# Patient Record
Sex: FEMALE | Race: WHITE
[De-identification: names, ages, dates, MRNs, and addresses within clinical notes are randomized per-mention and may not be internally consistent; named-entity substitution may affect disease eponyms.]

---

## 2019-06-24 ENCOUNTER — HOSPITAL ENCOUNTER (OUTPATIENT)
Dept: HOSPITAL 6 - LAB | Age: 64
End: 2019-06-24
Attending: INTERNAL MEDICINE
Payer: SELF-PAY

## 2019-06-24 DIAGNOSIS — D75.1: Primary | ICD-10-CM

## 2019-06-24 LAB
ERYTHROCYTE [DISTWIDTH] IN BLOOD BY AUTOMATED COUNT: 16.1 % (ref 11.5–14.5)
HCT VFR BLD AUTO: 51.9 % (ref 37–47)
HGB BLD-MCNC: 17.3 G/DL (ref 12.5–16)
LYMPHOCYTES NFR BLD MANUAL: 13 % (ref 20–51)
MCH RBC QN AUTO: 28 PG (ref 27–31)
MCHC RBC AUTO-ENTMCNC: 33 G/DL (ref 33–37)
MCV RBC AUTO: 83 FL (ref 78–100)
MONOCYTES NFR BLD: 5 % (ref 3–10)
NEUTS SEG NFR BLD MANUAL: 80 % (ref 42–75)
PLATELET # BLD AUTO: 1203 K/MM3 (ref 130–400)
PMV BLD AUTO: 10.8 FL (ref 7.4–10.4)
RBC # BLD AUTO: 6.25 M/MM3 (ref 4.1–5.3)
WBC # BLD AUTO: 15.2 K/MM3 (ref 4.8–10.8)

## 2019-07-08 ENCOUNTER — HOSPITAL ENCOUNTER (OUTPATIENT)
Dept: HOSPITAL 6 - LAB | Age: 64
End: 2019-07-08
Attending: INTERNAL MEDICINE
Payer: SELF-PAY

## 2019-07-08 DIAGNOSIS — D75.1: Primary | ICD-10-CM

## 2019-07-08 LAB
BASOPHILS # BLD: 0.1 10*3/UL (ref 0.02–0.1)
EOSINOPHIL # BLD: 0.2 10*3/UL (ref 0.04–0.4)
EOSINOPHIL NFR BLD: 1.9 % (ref 1–5)
ERYTHROCYTE [DISTWIDTH] IN BLOOD BY AUTOMATED COUNT: 18 % (ref 11.5–14.5)
HCT VFR BLD AUTO: 45.5 % (ref 37–47)
HGB BLD-MCNC: 15 G/DL (ref 12.5–16)
LYMPHOCYTES # BLD: 1.8 10*3/UL (ref 1.5–4)
MCH RBC QN AUTO: 29 PG (ref 27–31)
MCHC RBC AUTO-ENTMCNC: 33 G/DL (ref 33–37)
MCV RBC AUTO: 87 FL (ref 78–100)
MONOCYTES # BLD: 0.6 10*3/UL (ref 0.2–0.8)
NEUTROPHILS # BLD: 9.6 10*3/UL (ref 1.4–6.5)
PLATELET # BLD AUTO: 1042 K/MM3 (ref 130–400)
PMV BLD AUTO: 10.2 FL (ref 7.4–10.4)
RBC # BLD AUTO: 5.25 M/MM3 (ref 4.1–5.3)
WBC # BLD AUTO: 12.4 K/MM3 (ref 4.8–10.8)

## 2019-07-22 ENCOUNTER — HOSPITAL ENCOUNTER (OUTPATIENT)
Dept: HOSPITAL 6 - LAB | Age: 64
End: 2019-07-22
Attending: INTERNAL MEDICINE
Payer: SELF-PAY

## 2019-07-22 DIAGNOSIS — D75.9: Primary | ICD-10-CM

## 2019-07-22 LAB
BASOPHILS # BLD: 0.1 10*3/UL (ref 0.02–0.1)
EOSINOPHIL # BLD: 0.3 10*3/UL (ref 0.04–0.4)
EOSINOPHIL NFR BLD: 2.3 % (ref 1–5)
ERYTHROCYTE [DISTWIDTH] IN BLOOD BY AUTOMATED COUNT: 19.1 % (ref 11.5–14.5)
HCT VFR BLD AUTO: 43.7 % (ref 37–47)
HGB BLD-MCNC: 14.5 G/DL (ref 12.5–16)
LYMPHOCYTES # BLD: 1.9 10*3/UL (ref 1.5–4)
MCH RBC QN AUTO: 30 PG (ref 27–31)
MCHC RBC AUTO-ENTMCNC: 33 G/DL (ref 33–37)
MCV RBC AUTO: 90 FL (ref 78–100)
MONOCYTES # BLD: 0.7 10*3/UL (ref 0.2–0.8)
NEUTROPHILS # BLD: 7.9 10*3/UL (ref 1.4–6.5)
PLATELET # BLD AUTO: 862 K/MM3 (ref 130–400)
PMV BLD AUTO: 10.4 FL (ref 7.4–10.4)
RBC # BLD AUTO: 4.84 M/MM3 (ref 4.1–5.3)
WBC # BLD AUTO: 10.8 K/MM3 (ref 4.8–10.8)

## 2019-08-26 ENCOUNTER — HOSPITAL ENCOUNTER (OUTPATIENT)
Dept: HOSPITAL 6 - LAB | Age: 64
End: 2019-08-26
Attending: INTERNAL MEDICINE
Payer: SELF-PAY

## 2019-08-26 DIAGNOSIS — D75.1: Primary | ICD-10-CM

## 2019-08-26 LAB
BASOPHILS # BLD: 0 10*3/UL (ref 0.02–0.1)
EOSINOPHIL # BLD: 0.1 10*3/UL (ref 0.04–0.4)
EOSINOPHIL NFR BLD: 2.1 % (ref 1–5)
ERYTHROCYTE [DISTWIDTH] IN BLOOD BY AUTOMATED COUNT: 17.4 % (ref 11.5–14.5)
HCT VFR BLD AUTO: 41.6 % (ref 37–47)
HGB BLD-MCNC: 13.7 G/DL (ref 12.5–16)
LYMPHOCYTES # BLD: 1 10*3/UL (ref 1.5–4)
MCH RBC QN AUTO: 32 PG (ref 27–31)
MCHC RBC AUTO-ENTMCNC: 33 G/DL (ref 33–37)
MCV RBC AUTO: 96 FL (ref 78–100)
MONOCYTES # BLD: 0.3 10*3/UL (ref 0.2–0.8)
NEUTROPHILS # BLD: 3.9 10*3/UL (ref 1.4–6.5)
PLATELET # BLD AUTO: 262 K/MM3 (ref 130–400)
PMV BLD AUTO: 10.1 FL (ref 7.4–10.4)
RBC # BLD AUTO: 4.33 M/MM3 (ref 4.1–5.3)
WBC # BLD AUTO: 5.3 K/MM3 (ref 4.8–10.8)

## 2019-10-07 ENCOUNTER — HOSPITAL ENCOUNTER (OUTPATIENT)
Dept: HOSPITAL 6 - LAB | Age: 64
End: 2019-10-07
Attending: INTERNAL MEDICINE
Payer: SELF-PAY

## 2019-10-07 DIAGNOSIS — D75.1: Primary | ICD-10-CM

## 2019-10-07 LAB
BASOPHILS # BLD: 0 10*3/UL (ref 0.02–0.1)
EOSINOPHIL # BLD: 0.1 10*3/UL (ref 0.04–0.4)
EOSINOPHIL NFR BLD: 1.3 % (ref 1–5)
ERYTHROCYTE [DISTWIDTH] IN BLOOD BY AUTOMATED COUNT: 17.4 % (ref 11.5–14.5)
HCT VFR BLD AUTO: 37.4 % (ref 37–47)
HGB BLD-MCNC: 12.8 G/DL (ref 12.5–16)
LYMPHOCYTES # BLD: 0.9 10*3/UL (ref 1.5–4)
MCH RBC QN AUTO: 35 PG (ref 27–31)
MCHC RBC AUTO-ENTMCNC: 34 G/DL (ref 33–37)
MCV RBC AUTO: 101 FL (ref 78–100)
MONOCYTES # BLD: 0.3 10*3/UL (ref 0.2–0.8)
NEUTROPHILS # BLD: 3.9 10*3/UL (ref 1.4–6.5)
PLATELET # BLD AUTO: 141 K/MM3 (ref 130–400)
PMV BLD AUTO: 10.5 FL (ref 7.4–10.4)
RBC # BLD AUTO: 3.71 M/MM3 (ref 4.1–5.3)
WBC # BLD AUTO: 5.2 K/MM3 (ref 4.8–10.8)

## 2019-10-28 ENCOUNTER — HOSPITAL ENCOUNTER (OUTPATIENT)
Dept: HOSPITAL 6 - LAB | Age: 64
End: 2019-10-28
Attending: INTERNAL MEDICINE
Payer: SELF-PAY

## 2019-10-28 DIAGNOSIS — D75.1: Primary | ICD-10-CM

## 2019-10-28 LAB
BASOPHILS # BLD: 0 10*3/UL (ref 0.02–0.1)
EOSINOPHIL # BLD: 0 10*3/UL (ref 0.04–0.4)
EOSINOPHIL NFR BLD: 0.6 % (ref 1–5)
ERYTHROCYTE [DISTWIDTH] IN BLOOD BY AUTOMATED COUNT: 18.3 % (ref 11.5–14.5)
HCT VFR BLD AUTO: 37 % (ref 37–47)
HGB BLD-MCNC: 12.8 G/DL (ref 12.5–16)
LYMPHOCYTES # BLD: 1 10*3/UL (ref 1.5–4)
MCH RBC QN AUTO: 36 PG (ref 27–31)
MCHC RBC AUTO-ENTMCNC: 35 G/DL (ref 33–37)
MCV RBC AUTO: 104 FL (ref 78–100)
MONOCYTES # BLD: 0.3 10*3/UL (ref 0.2–0.8)
NEUTROPHILS # BLD: 2.1 10*3/UL (ref 1.4–6.5)
PLATELET # BLD AUTO: 299 K/MM3 (ref 130–400)
PMV BLD AUTO: 9.8 FL (ref 7.4–10.4)
RBC # BLD AUTO: 3.55 M/MM3 (ref 4.1–5.3)
WBC # BLD AUTO: 3.4 K/MM3 (ref 4.8–10.8)

## 2019-11-18 ENCOUNTER — HOSPITAL ENCOUNTER (OUTPATIENT)
Dept: HOSPITAL 6 - LAB | Age: 64
End: 2019-11-18
Attending: INTERNAL MEDICINE
Payer: SELF-PAY

## 2019-11-18 DIAGNOSIS — D75.1: Primary | ICD-10-CM

## 2019-11-18 LAB
BASOPHILS # BLD: 0 10*3/UL (ref 0.02–0.1)
EOSINOPHIL # BLD: 0.1 10*3/UL (ref 0.04–0.4)
EOSINOPHIL NFR BLD: 1.2 % (ref 1–5)
ERYTHROCYTE [DISTWIDTH] IN BLOOD BY AUTOMATED COUNT: 16.6 % (ref 11.5–14.5)
HCT VFR BLD AUTO: 36.9 % (ref 37–47)
HGB BLD-MCNC: 12.7 G/DL (ref 12.5–16)
LYMPHOCYTES # BLD: 1 10*3/UL (ref 1.5–4)
MCH RBC QN AUTO: 38 PG (ref 27–31)
MCHC RBC AUTO-ENTMCNC: 34 G/DL (ref 33–37)
MCV RBC AUTO: 110 FL (ref 78–100)
MONOCYTES # BLD: 0.3 10*3/UL (ref 0.2–0.8)
NEUTROPHILS # BLD: 3.8 10*3/UL (ref 1.4–6.5)
PLATELET # BLD AUTO: 103 K/MM3 (ref 130–400)
PMV BLD AUTO: 10.5 FL (ref 7.4–10.4)
RBC # BLD AUTO: 3.36 M/MM3 (ref 4.1–5.3)
WBC # BLD AUTO: 5.2 K/MM3 (ref 4.8–10.8)

## 2019-12-16 ENCOUNTER — HOSPITAL ENCOUNTER (OUTPATIENT)
Dept: HOSPITAL 6 - LAB | Age: 64
End: 2019-12-16
Attending: INTERNAL MEDICINE
Payer: SELF-PAY

## 2019-12-16 DIAGNOSIS — D75.1: Primary | ICD-10-CM

## 2019-12-16 LAB
BASOPHILS # BLD: 0 10*3/UL (ref 0.02–0.1)
EOSINOPHIL # BLD: 0 10*3/UL (ref 0.04–0.4)
EOSINOPHIL NFR BLD: 0.3 % (ref 1–5)
ERYTHROCYTE [DISTWIDTH] IN BLOOD BY AUTOMATED COUNT: 16.2 % (ref 11.5–14.5)
HCT VFR BLD AUTO: 24.5 % (ref 37–47)
HGB BLD-MCNC: 7.9 G/DL (ref 12.5–16)
LYMPHOCYTES # BLD: 1 10*3/UL (ref 1.5–4)
MCH RBC QN AUTO: 40 PG (ref 27–31)
MCHC RBC AUTO-ENTMCNC: 32 G/DL (ref 33–37)
MCV RBC AUTO: 123 FL (ref 78–100)
MONOCYTES # BLD: 0.6 10*3/UL (ref 0.2–0.8)
NEUTROPHILS # BLD: 4.9 10*3/UL (ref 1.4–6.5)
PLATELET # BLD AUTO: 386 K/MM3 (ref 130–400)
PMV BLD AUTO: 10.2 FL (ref 7.4–10.4)
RBC # BLD AUTO: 2 M/MM3 (ref 4.1–5.3)
WBC # BLD AUTO: 6.6 K/MM3 (ref 4.8–10.8)

## 2019-12-17 ENCOUNTER — HOSPITAL ENCOUNTER (OUTPATIENT)
Dept: HOSPITAL 6 - LAB | Age: 64
End: 2019-12-17
Attending: INTERNAL MEDICINE
Payer: SELF-PAY

## 2019-12-17 VITALS — SYSTOLIC BLOOD PRESSURE: 119 MMHG | DIASTOLIC BLOOD PRESSURE: 54 MMHG

## 2019-12-17 VITALS — SYSTOLIC BLOOD PRESSURE: 118 MMHG | DIASTOLIC BLOOD PRESSURE: 52 MMHG

## 2019-12-17 VITALS — DIASTOLIC BLOOD PRESSURE: 56 MMHG | SYSTOLIC BLOOD PRESSURE: 115 MMHG

## 2019-12-17 VITALS — DIASTOLIC BLOOD PRESSURE: 54 MMHG | SYSTOLIC BLOOD PRESSURE: 110 MMHG

## 2019-12-17 VITALS
SYSTOLIC BLOOD PRESSURE: 140 MMHG | DIASTOLIC BLOOD PRESSURE: 55 MMHG | SYSTOLIC BLOOD PRESSURE: 140 MMHG | DIASTOLIC BLOOD PRESSURE: 55 MMHG | DIASTOLIC BLOOD PRESSURE: 55 MMHG | DIASTOLIC BLOOD PRESSURE: 55 MMHG | SYSTOLIC BLOOD PRESSURE: 140 MMHG | DIASTOLIC BLOOD PRESSURE: 55 MMHG | SYSTOLIC BLOOD PRESSURE: 140 MMHG | SYSTOLIC BLOOD PRESSURE: 140 MMHG | DIASTOLIC BLOOD PRESSURE: 55 MMHG | SYSTOLIC BLOOD PRESSURE: 140 MMHG

## 2019-12-17 VITALS — SYSTOLIC BLOOD PRESSURE: 116 MMHG | DIASTOLIC BLOOD PRESSURE: 55 MMHG

## 2019-12-17 VITALS — HEIGHT: 64.02 IN | WEIGHT: 130.29 LBS | BODY MASS INDEX: 22.24 KG/M2

## 2019-12-17 VITALS — SYSTOLIC BLOOD PRESSURE: 141 MMHG | DIASTOLIC BLOOD PRESSURE: 52 MMHG

## 2019-12-17 VITALS — SYSTOLIC BLOOD PRESSURE: 126 MMHG | DIASTOLIC BLOOD PRESSURE: 51 MMHG

## 2019-12-17 VITALS — SYSTOLIC BLOOD PRESSURE: 109 MMHG | DIASTOLIC BLOOD PRESSURE: 52 MMHG

## 2019-12-17 VITALS — DIASTOLIC BLOOD PRESSURE: 51 MMHG | SYSTOLIC BLOOD PRESSURE: 126 MMHG

## 2019-12-17 DIAGNOSIS — D75.1: ICD-10-CM

## 2019-12-17 DIAGNOSIS — D47.1: Primary | ICD-10-CM

## 2019-12-17 PROCEDURE — P9016 RBC LEUKOCYTES REDUCED: HCPCS

## 2019-12-23 ENCOUNTER — HOSPITAL ENCOUNTER (OUTPATIENT)
Dept: HOSPITAL 6 - LAB | Age: 64
End: 2019-12-23
Attending: INTERNAL MEDICINE
Payer: SELF-PAY

## 2019-12-23 DIAGNOSIS — D75.9: Primary | ICD-10-CM

## 2019-12-23 LAB
ERYTHROCYTE [DISTWIDTH] IN BLOOD BY AUTOMATED COUNT: 20 % (ref 11.5–14.5)
HCT VFR BLD AUTO: 36.8 % (ref 37–47)
HGB BLD-MCNC: 11.9 G/DL (ref 12.5–16)
LYMPHOCYTES NFR BLD MANUAL: 8 % (ref 20–51)
MACROCYTES BLD QL SMEAR: (no result)
MCH RBC QN AUTO: 36 PG (ref 27–31)
MCHC RBC AUTO-ENTMCNC: 32 G/DL (ref 33–37)
MCV RBC AUTO: 113 FL (ref 78–100)
MONOCYTES NFR BLD: 5 % (ref 3–10)
NEUTS SEG NFR BLD MANUAL: 86 % (ref 42–75)
OVALOCYTES BLD QL SMEAR: (no result)
PLATELET # BLD AUTO: 310 K/MM3 (ref 130–400)
PMV BLD AUTO: 10.6 FL (ref 7.4–10.4)
RBC # BLD AUTO: 3.27 M/MM3 (ref 4.1–5.3)
WBC # BLD AUTO: 6.5 K/MM3 (ref 4.8–10.8)

## 2020-01-13 ENCOUNTER — HOSPITAL ENCOUNTER (OUTPATIENT)
Dept: HOSPITAL 6 - LAB | Age: 65
End: 2020-01-13
Attending: INTERNAL MEDICINE
Payer: SELF-PAY

## 2020-01-13 DIAGNOSIS — D47.1: Primary | ICD-10-CM

## 2020-01-13 LAB
BASOPHILS # BLD: 0 10*3/UL (ref 0.02–0.1)
EOSINOPHIL # BLD: 0.1 10*3/UL (ref 0.04–0.4)
EOSINOPHIL NFR BLD: 1.3 % (ref 1–5)
ERYTHROCYTE [DISTWIDTH] IN BLOOD BY AUTOMATED COUNT: 16.2 % (ref 11.5–14.5)
HCT VFR BLD AUTO: 40.3 % (ref 37–47)
HGB BLD-MCNC: 13.4 G/DL (ref 12.5–16)
LYMPHOCYTES # BLD: 1.4 10*3/UL (ref 1.5–4)
MCH RBC QN AUTO: 35 PG (ref 27–31)
MCHC RBC AUTO-ENTMCNC: 33 G/DL (ref 33–37)
MCV RBC AUTO: 106 FL (ref 78–100)
MONOCYTES # BLD: 0.4 10*3/UL (ref 0.2–0.8)
NEUTROPHILS # BLD: 5.8 10*3/UL (ref 1.4–6.5)
PLATELET # BLD AUTO: 280 K/MM3 (ref 130–400)
PMV BLD AUTO: 10.8 FL (ref 7.4–10.4)
RBC # BLD AUTO: 3.79 M/MM3 (ref 4.1–5.3)
WBC # BLD AUTO: 7.8 K/MM3 (ref 4.8–10.8)

## 2020-02-10 ENCOUNTER — HOSPITAL ENCOUNTER (OUTPATIENT)
Dept: HOSPITAL 6 - LAB | Age: 65
End: 2020-02-10
Attending: INTERNAL MEDICINE
Payer: SELF-PAY

## 2020-02-10 DIAGNOSIS — D47.1: Primary | ICD-10-CM

## 2020-02-10 LAB
ALBUMIN SERPL-MCNC: 4.2 G/DL (ref 3.4–4.8)
ALT SERPL-CCNC: 19 U/L (ref 0–55)
AST SERPL-CCNC: 22 U/L (ref 5–34)
BASOPHILS # BLD: 0 10*3/UL (ref 0.02–0.1)
BILIRUB SERPL-MCNC: 0.6 MG/DL (ref 0.2–1.2)
CALCIUM SERPL-MCNC: 9.4 MG/DL (ref 8.3–10.5)
CO2 SERPL-SCNC: 24 MMOL/L (ref 23–31)
EOSINOPHIL # BLD: 0.1 10*3/UL (ref 0.04–0.4)
EOSINOPHIL NFR BLD: 1 % (ref 1–5)
ERYTHROCYTE [DISTWIDTH] IN BLOOD BY AUTOMATED COUNT: 15.8 % (ref 11.5–14.5)
GLUCOSE SERPL-MCNC: 110 MG/DL (ref 65–105)
HCT VFR BLD AUTO: 39.3 % (ref 37–47)
HGB BLD-MCNC: 13.2 G/DL (ref 12.5–16)
LYMPHOCYTES # BLD: 1.2 10*3/UL (ref 1.5–4)
MCH RBC QN AUTO: 37 PG (ref 27–31)
MCHC RBC AUTO-ENTMCNC: 34 G/DL (ref 33–37)
MCV RBC AUTO: 109 FL (ref 78–100)
MONOCYTES # BLD: 0.3 10*3/UL (ref 0.2–0.8)
NEUTROPHILS # BLD: 3.5 10*3/UL (ref 1.4–6.5)
PLATELET # BLD AUTO: 296 K/MM3 (ref 130–400)
PMV BLD AUTO: 9.6 FL (ref 7.4–10.4)
POTASSIUM SERPL-SCNC: 4.8 MMOL/L (ref 3.5–5.1)
PROT SERPL-MCNC: 7.2 G/DL (ref 6.2–8.1)
RBC # BLD AUTO: 3.62 M/MM3 (ref 4.1–5.3)
SODIUM SERPL-SCNC: 138 MMOL/L (ref 136–145)
WBC # BLD AUTO: 5 K/MM3 (ref 4.8–10.8)

## 2020-03-16 ENCOUNTER — HOSPITAL ENCOUNTER (OUTPATIENT)
Dept: HOSPITAL 6 - LAB | Age: 65
End: 2020-03-16
Attending: INTERNAL MEDICINE
Payer: SELF-PAY

## 2020-03-16 DIAGNOSIS — D47.1: Primary | ICD-10-CM

## 2020-03-16 LAB
BASOPHILS # BLD: 0 10*3/UL (ref 0.02–0.1)
EOSINOPHIL # BLD: 0.1 10*3/UL (ref 0.04–0.4)
EOSINOPHIL NFR BLD: 1.1 % (ref 1–5)
ERYTHROCYTE [DISTWIDTH] IN BLOOD BY AUTOMATED COUNT: 14.3 % (ref 11.5–14.5)
HCT VFR BLD AUTO: 38.8 % (ref 37–47)
HGB BLD-MCNC: 13.1 G/DL (ref 12.5–16)
LYMPHOCYTES # BLD: 1.5 10*3/UL (ref 1.5–4)
MCH RBC QN AUTO: 37 PG (ref 27–31)
MCHC RBC AUTO-ENTMCNC: 34 G/DL (ref 33–37)
MCV RBC AUTO: 109 FL (ref 78–100)
MONOCYTES # BLD: 0.4 10*3/UL (ref 0.2–0.8)
NEUTROPHILS # BLD: 3.6 10*3/UL (ref 1.4–6.5)
PLATELET # BLD AUTO: 347 K/MM3 (ref 130–400)
PMV BLD AUTO: 9.5 FL (ref 7.4–10.4)
RBC # BLD AUTO: 3.56 M/MM3 (ref 4.1–5.3)
WBC # BLD AUTO: 5.6 K/MM3 (ref 4.8–10.8)

## 2020-05-06 ENCOUNTER — HOSPITAL ENCOUNTER (OUTPATIENT)
Dept: HOSPITAL 6 - LAB | Age: 65
End: 2020-05-06
Attending: INTERNAL MEDICINE
Payer: SELF-PAY

## 2020-05-06 DIAGNOSIS — D47.1: Primary | ICD-10-CM

## 2020-05-06 LAB
BASOPHILS # BLD: 0 10*3/UL (ref 0.02–0.1)
EOSINOPHIL # BLD: 0.1 10*3/UL (ref 0.04–0.4)
EOSINOPHIL NFR BLD: 1.8 % (ref 1–5)
ERYTHROCYTE [DISTWIDTH] IN BLOOD BY AUTOMATED COUNT: 11.6 % (ref 11.5–14.5)
HCT VFR BLD AUTO: 38.7 % (ref 37–47)
HGB BLD-MCNC: 12.8 G/DL (ref 12.5–16)
LYMPHOCYTES # BLD: 1.3 10*3/UL (ref 1.5–4)
MCH RBC QN AUTO: 37 PG (ref 27–31)
MCHC RBC AUTO-ENTMCNC: 33 G/DL (ref 33–37)
MCV RBC AUTO: 111 FL (ref 78–100)
MONOCYTES # BLD: 0.5 10*3/UL (ref 0.2–0.8)
NEUTROPHILS # BLD: 4.2 10*3/UL (ref 1.4–6.5)
PLATELET # BLD AUTO: 344 K/MM3 (ref 130–400)
PMV BLD AUTO: 9.6 FL (ref 7.4–10.4)
RBC # BLD AUTO: 3.5 M/MM3 (ref 4.1–5.3)
WBC # BLD AUTO: 6.2 K/MM3 (ref 4.8–10.8)

## 2022-05-14 ENCOUNTER — HOSPITAL ENCOUNTER (INPATIENT)
Dept: HOSPITAL 19 - COL.ER | Age: 67
LOS: 3 days | Discharge: HOME | DRG: 812 | End: 2022-05-17
Attending: INTERNAL MEDICINE | Admitting: INTERNAL MEDICINE
Payer: MEDICARE

## 2022-05-14 VITALS — HEART RATE: 101 BPM | DIASTOLIC BLOOD PRESSURE: 52 MMHG | SYSTOLIC BLOOD PRESSURE: 102 MMHG | TEMPERATURE: 99.5 F

## 2022-05-14 VITALS — WEIGHT: 145.51 LBS | HEIGHT: 55 IN

## 2022-05-14 VITALS — TEMPERATURE: 99.4 F | HEART RATE: 97 BPM | DIASTOLIC BLOOD PRESSURE: 57 MMHG | SYSTOLIC BLOOD PRESSURE: 119 MMHG

## 2022-05-14 VITALS — DIASTOLIC BLOOD PRESSURE: 50 MMHG | HEART RATE: 91 BPM | SYSTOLIC BLOOD PRESSURE: 115 MMHG | TEMPERATURE: 98.2 F

## 2022-05-14 VITALS — SYSTOLIC BLOOD PRESSURE: 126 MMHG | TEMPERATURE: 98.7 F | DIASTOLIC BLOOD PRESSURE: 47 MMHG | HEART RATE: 91 BPM

## 2022-05-14 VITALS — TEMPERATURE: 99.4 F | HEART RATE: 92 BPM | SYSTOLIC BLOOD PRESSURE: 114 MMHG | DIASTOLIC BLOOD PRESSURE: 62 MMHG

## 2022-05-14 VITALS — SYSTOLIC BLOOD PRESSURE: 111 MMHG | DIASTOLIC BLOOD PRESSURE: 66 MMHG | TEMPERATURE: 99.2 F | HEART RATE: 96 BPM

## 2022-05-14 VITALS — TEMPERATURE: 99.3 F | DIASTOLIC BLOOD PRESSURE: 58 MMHG | SYSTOLIC BLOOD PRESSURE: 114 MMHG | HEART RATE: 94 BPM

## 2022-05-14 DIAGNOSIS — Z87.891: ICD-10-CM

## 2022-05-14 DIAGNOSIS — D64.9: Primary | ICD-10-CM

## 2022-05-14 DIAGNOSIS — J20.9: ICD-10-CM

## 2022-05-14 DIAGNOSIS — J44.1: ICD-10-CM

## 2022-05-14 DIAGNOSIS — J47.9: ICD-10-CM

## 2022-05-14 DIAGNOSIS — D45: ICD-10-CM

## 2022-05-14 DIAGNOSIS — J44.0: ICD-10-CM

## 2022-05-14 DIAGNOSIS — R16.1: ICD-10-CM

## 2022-05-14 DIAGNOSIS — Z79.52: ICD-10-CM

## 2022-05-14 DIAGNOSIS — D47.3: ICD-10-CM

## 2022-05-14 LAB
ALBUMIN SERPL-MCNC: 3.7 GM/DL (ref 3.4–4.8)
ALP SERPL-CCNC: 67 U/L (ref 40–150)
ALT SERPL-CCNC: 15 U/L (ref 0–55)
ANION GAP SERPL CALC-SCNC: 11 MMOL/L (ref 7–16)
AST SERPL-CCNC: 14 U/L (ref 5–34)
BASOPHILS # BLD: 0.1 K/MM3 (ref 0–0.2)
BASOPHILS NFR BLD AUTO: 1.6 % (ref 0–2)
BASOPHILS NFR BLD MANUAL: 5 % (ref 0–2)
BILIRUB SERPL-MCNC: 0.7 MG/DL (ref 0.2–1.2)
BUN SERPL-MCNC: 14 MG/DL (ref 10–20)
CALCIUM SERPL-MCNC: 8.6 MG/DL (ref 8.4–10.2)
CHLORIDE SERPL-SCNC: 99 MMOL/L (ref 98–107)
CO2 SERPL-SCNC: 23 MMOL/L (ref 23–31)
CREAT SERPL-SCNC: 0.71 MG/DL (ref 0.57–1.11)
EOSINOPHIL # BLD: 0.1 K/MM3 (ref 0–0.7)
EOSINOPHIL NFR BLD: 1.4 % (ref 0–4)
EOSINOPHIL NFR BLD: 3 % (ref 0–4)
ERYTHROCYTE [DISTWIDTH] IN BLOOD BY AUTOMATED COUNT: 14 % (ref 11.5–14.5)
GLUCOSE SERPL-MCNC: 125 MG/DL (ref 70–99)
GRANULOCYTES # BLD AUTO: 53.7 % (ref 42.2–75.2)
HCT VFR BLD AUTO: 16.6 % (ref 37–47)
HCT VFR BLD AUTO: 19.1 % (ref 37–47)
HGB BLD-MCNC: 5.8 G/DL (ref 12.5–16)
HGB BLD-MCNC: 6.9 G/DL (ref 12.5–16)
HYPOCHROMIA BLD QL SMEAR: (no result)
IRON SERPL-MCNC: 197 UG/DL (ref 50–175)
LYMPHOCYTES # BLD: 0.7 K/MM3 (ref 1.2–3.4)
LYMPHOCYTES NFR BLD MANUAL: 27 % (ref 20–51)
LYMPHOCYTES NFR BLD: 15.1 % (ref 20–51)
MACROCYTES BLD QL SMEAR: (no result)
MCH RBC QN AUTO: 38 PG (ref 27–31)
MCHC RBC AUTO-ENTMCNC: 35 G/DL (ref 33–37)
MCV RBC AUTO: 109 FL (ref 80–100)
MONOCYTES # BLD: 0.5 K/MM3 (ref 0.1–0.6)
MONOCYTES NFR BLD AUTO: 10.8 % (ref 1.7–9.3)
MONOCYTES NFR BLD: 5 % (ref 1.7–9.3)
NEUTROPHILS # BLD: 2.6 K/MM3 (ref 1.4–6.5)
NEUTS BAND NFR BLD: 7 % (ref 0–10)
NEUTS SEG NFR BLD MANUAL: 53 % (ref 42–75.2)
PH UR STRIP.AUTO: 7 [PH] (ref 5–8)
PLATELET # BLD AUTO: 30 K/MM3 (ref 130–400)
PLATELET BLD QL SMEAR: (no result)
PMV BLD AUTO: 12.4 FL (ref 7.4–10.4)
POTASSIUM SERPL-SCNC: 3.9 MMOL/L (ref 3.5–4.5)
PROT SERPL-MCNC: 6.2 GM/DL (ref 6.2–8.1)
RBC # BLD AUTO: 1.53 M/MM3 (ref 4.1–5.3)
RETICS #: 0.01 M/MM3 (ref 0.02–0.16)
RETICS/RBC NFR AUTO: 0.9 % (ref 0.5–3.52)
SODIUM SERPL-SCNC: 133 MMOL/L (ref 136–145)
SP GR UR STRIP.AUTO: 1 (ref 1–1.03)
TROPONIN I SERPL-MCNC: 0.01 NG/ML (ref 0–0.03)
URN COLLECT METHOD CLASS: (no result)

## 2022-05-14 PROCEDURE — P9016 RBC LEUKOCYTES REDUCED: HCPCS

## 2022-05-14 NOTE — NUR
PATIENT ADMITTED TO MEDICAL FLOOR. PATIENT CONTINUES TO ENDORSE SHORTNESS OF
BREATH. SATTING WELL ON ROOM AIR. 1 UNIT OF PRBCS TRANSFUSED. PER HOSPITALIST,
WILL RECHECK HGB IN APPROX ONE HOUR. ALL SAFETY MEASURES MAINTAINED. WILL
CONTINUE TO MONITOR

## 2022-05-15 VITALS — DIASTOLIC BLOOD PRESSURE: 49 MMHG | SYSTOLIC BLOOD PRESSURE: 113 MMHG | HEART RATE: 88 BPM | TEMPERATURE: 98 F

## 2022-05-15 VITALS — DIASTOLIC BLOOD PRESSURE: 50 MMHG | SYSTOLIC BLOOD PRESSURE: 127 MMHG | TEMPERATURE: 97.9 F | HEART RATE: 81 BPM

## 2022-05-15 VITALS — HEART RATE: 83 BPM | DIASTOLIC BLOOD PRESSURE: 56 MMHG | TEMPERATURE: 98.5 F | SYSTOLIC BLOOD PRESSURE: 102 MMHG

## 2022-05-15 VITALS — TEMPERATURE: 98.4 F | DIASTOLIC BLOOD PRESSURE: 52 MMHG | HEART RATE: 85 BPM | SYSTOLIC BLOOD PRESSURE: 116 MMHG

## 2022-05-15 VITALS — TEMPERATURE: 99 F | HEART RATE: 87 BPM | SYSTOLIC BLOOD PRESSURE: 91 MMHG | DIASTOLIC BLOOD PRESSURE: 59 MMHG

## 2022-05-15 VITALS — DIASTOLIC BLOOD PRESSURE: 55 MMHG | HEART RATE: 83 BPM | SYSTOLIC BLOOD PRESSURE: 115 MMHG | TEMPERATURE: 98 F

## 2022-05-15 VITALS — DIASTOLIC BLOOD PRESSURE: 46 MMHG | HEART RATE: 82 BPM | TEMPERATURE: 98.1 F | SYSTOLIC BLOOD PRESSURE: 104 MMHG

## 2022-05-15 VITALS — HEART RATE: 84 BPM | SYSTOLIC BLOOD PRESSURE: 105 MMHG | TEMPERATURE: 98.8 F | DIASTOLIC BLOOD PRESSURE: 43 MMHG

## 2022-05-15 VITALS — TEMPERATURE: 98 F | SYSTOLIC BLOOD PRESSURE: 116 MMHG | DIASTOLIC BLOOD PRESSURE: 46 MMHG | HEART RATE: 81 BPM

## 2022-05-15 VITALS — SYSTOLIC BLOOD PRESSURE: 100 MMHG | HEART RATE: 78 BPM | TEMPERATURE: 98.3 F | DIASTOLIC BLOOD PRESSURE: 49 MMHG

## 2022-05-15 VITALS — DIASTOLIC BLOOD PRESSURE: 45 MMHG | TEMPERATURE: 98.2 F | HEART RATE: 82 BPM | SYSTOLIC BLOOD PRESSURE: 103 MMHG

## 2022-05-15 VITALS — HEART RATE: 91 BPM | DIASTOLIC BLOOD PRESSURE: 36 MMHG | SYSTOLIC BLOOD PRESSURE: 106 MMHG | TEMPERATURE: 98.3 F

## 2022-05-15 VITALS — HEART RATE: 82 BPM | SYSTOLIC BLOOD PRESSURE: 103 MMHG | TEMPERATURE: 98.2 F | DIASTOLIC BLOOD PRESSURE: 45 MMHG

## 2022-05-15 LAB
ALBUMIN SERPL-MCNC: 3.4 GM/DL (ref 3.4–4.8)
ALP SERPL-CCNC: 63 U/L (ref 40–150)
ALT SERPL-CCNC: 12 U/L (ref 0–55)
ANION GAP SERPL CALC-SCNC: 11 MMOL/L (ref 7–16)
ANISOCYTOSIS BLD QL: (no result)
AST SERPL-CCNC: 10 U/L (ref 5–34)
BASOPHILS NFR BLD MANUAL: 3 % (ref 0–2)
BILIRUB DIRECT SERPL-MCNC: 0.3 MG/DL (ref 0–0.5)
BILIRUB SERPL-MCNC: 0.8 MG/DL (ref 0.2–1.2)
BUN SERPL-MCNC: 18 MG/DL (ref 10–20)
CALCIUM SERPL-MCNC: 8.5 MG/DL (ref 8.4–10.2)
CHLORIDE SERPL-SCNC: 103 MMOL/L (ref 98–107)
CO2 SERPL-SCNC: 20 MMOL/L (ref 23–31)
CREAT SERPL-SCNC: 0.73 MG/DL (ref 0.57–1.11)
ERYTHROCYTE [DISTWIDTH] IN BLOOD BY AUTOMATED COUNT: 16.6 % (ref 11.5–14.5)
GLUCOSE SERPL-MCNC: 190 MG/DL (ref 70–99)
HCT VFR BLD AUTO: 23.1 % (ref 37–47)
HGB BLD-MCNC: 8.1 G/DL (ref 12.5–16)
LDH SERPL-CCNC: 288 U/L (ref 125–220)
LYMPHOCYTES NFR BLD MANUAL: 17 % (ref 20–51)
MACROCYTES BLD QL SMEAR: (no result)
MAGNESIUM SERPL-MCNC: 2.1 MG/DL (ref 1.6–2.6)
MCH RBC QN AUTO: 35 PG (ref 27–31)
MCHC RBC AUTO-ENTMCNC: 35 G/DL (ref 33–37)
MCV RBC AUTO: 100 FL (ref 80–100)
MONOCYTES NFR BLD: 10 % (ref 1.7–9.3)
NEUTS BAND NFR BLD: 6 % (ref 0–10)
NEUTS SEG NFR BLD MANUAL: 64 % (ref 42–75.2)
PLATELET # BLD AUTO: 27 K/MM3 (ref 130–400)
PLATELET BLD QL SMEAR: (no result)
PMV BLD AUTO: 10.1 FL (ref 7.4–10.4)
POTASSIUM SERPL-SCNC: 4.2 MMOL/L (ref 3.5–4.5)
PROT SERPL-MCNC: 5.7 GM/DL (ref 6.2–8.1)
RBC # BLD AUTO: 2.31 M/MM3 (ref 4.1–5.3)
SODIUM SERPL-SCNC: 134 MMOL/L (ref 136–145)

## 2022-05-15 NOTE — NUR
RECIEVED PHONE CALL FROM [employer], PATIENT HR 'S. CLARIFIED THAT THE
TACHYCARDIA LASTED ROUGHLY 30SEC. TELE TECH INDICATED THAT RHYTHM "SNAPPED ON
AND OFF AGAIN". AUTHOR REVIEWED PRINTOUT OF STRIP. VERIFIYING PATIENT HAD RUN
OF SVT. PATIENT HAS NO COMPLAINTS OF CHEST PAIN. SHE HAD JUST RETURNE TO BED,
HAVING USED THE RESTROOM, AND PLACING HER SCDS BACK ON HER LOWER EXTREMITIES.
SHE DID DESCRIBE HAVING A COUGHING FIT AND FEELING CHEST PALPITATIONS. ALSO
STATED THAT THEY SUBSIDED AFTER SETTELING IN BED AND RESTING. DR. HAMPTON
NOTIFIED BY PHONE, ORDERED EKG. ORDER PLACED AND RT NOTIFIED. AWAITING
RESULTS.

## 2022-05-15 NOTE — NUR
PATIENT RESTING COMFORTABLY IN BED. VOCALIZING ANTICIPATION TO BE DISCHARGED
HOME. NO COMPLAINTS OF PAIN. NO DYSPNEA, OR SOB. VERY PLESANT. INDEPENDENT IN
ROOM .

## 2022-05-15 NOTE — NUR
PATIENT INDEPENDENT IN ROOM, SHOWERED INDEPENDENTLY. NO COMPLAINTS OF PAIN.
SOME SKIN DISCOLORATION ON FEET, ANKLES AND INNER THIGHS. DR. CLARKE CONCERNS
FOR PETECHIA. PLATLETS AT 27 THIS AM. WILL CONTINUE TO MONITOR. PATIENT
EDUCATED ON SAFE MOBILITY, SOFT BRISTLE TOOTH BRUSH, NO SHAVING AT THIS TIME.
DARK PURPLE BRUISE ON LEFT AC FROM MORNING BLOOD DRAW. PATIENT HAD NOTED RUN
OF SVT TODAY. EXPERIENCED SOME PALPATIONS. EKG ORDERED, NSR. TELE STRIP AND
EKG ON CHART. NO OTHER SIGNIFICANT EVENTS THIS SHIFT. PATIENT VERY PLESANT.

## 2022-05-16 VITALS — HEART RATE: 77 BPM | SYSTOLIC BLOOD PRESSURE: 101 MMHG | DIASTOLIC BLOOD PRESSURE: 55 MMHG | TEMPERATURE: 98 F

## 2022-05-16 VITALS — TEMPERATURE: 97.9 F | DIASTOLIC BLOOD PRESSURE: 49 MMHG | SYSTOLIC BLOOD PRESSURE: 111 MMHG | HEART RATE: 80 BPM

## 2022-05-16 VITALS — SYSTOLIC BLOOD PRESSURE: 150 MMHG | TEMPERATURE: 97.8 F | DIASTOLIC BLOOD PRESSURE: 53 MMHG | HEART RATE: 113 BPM

## 2022-05-16 VITALS — SYSTOLIC BLOOD PRESSURE: 125 MMHG | TEMPERATURE: 98.6 F | HEART RATE: 100 BPM | DIASTOLIC BLOOD PRESSURE: 48 MMHG

## 2022-05-16 VITALS — SYSTOLIC BLOOD PRESSURE: 127 MMHG | HEART RATE: 92 BPM | TEMPERATURE: 97.7 F | DIASTOLIC BLOOD PRESSURE: 49 MMHG

## 2022-05-16 LAB
ANISOCYTOSIS BLD QL: (no result)
BASOPHILS NFR BLD MANUAL: 2 % (ref 0–2)
EOSINOPHIL NFR BLD: 1 % (ref 0–4)
ERYTHROCYTE [DISTWIDTH] IN BLOOD BY AUTOMATED COUNT: 15.9 % (ref 11.5–14.5)
ERYTHROCYTE [DISTWIDTH] IN BLOOD BY AUTOMATED COUNT: 16.6 % (ref 11.5–14.5)
HCT VFR BLD AUTO: 22.9 % (ref 37–47)
HCT VFR BLD AUTO: 25 % (ref 37–47)
HGB BLD-MCNC: 7.8 G/DL (ref 12.5–16)
HGB BLD-MCNC: 8.7 G/DL (ref 12.5–16)
LYMPHOCYTES NFR BLD MANUAL: 11 % (ref 20–51)
MACROCYTES BLD QL SMEAR: (no result)
MCH RBC QN AUTO: 35 PG (ref 27–31)
MCH RBC QN AUTO: 35 PG (ref 27–31)
MCHC RBC AUTO-ENTMCNC: 34 G/DL (ref 33–37)
MCHC RBC AUTO-ENTMCNC: 35 G/DL (ref 33–37)
MCV RBC AUTO: 102 FL (ref 80–100)
MCV RBC AUTO: 104 FL (ref 80–100)
METAMYELOCYTES NFR BLD MANUAL: 3 % (ref 0–0)
MONOCYTES NFR BLD: 5 % (ref 1.7–9.3)
MYELOCYTES NFR BLD MANUAL: 1 % (ref 0–0)
NEUTS BAND NFR BLD: 9 % (ref 0–10)
NEUTS SEG NFR BLD MANUAL: 68 % (ref 42–75.2)
NRBC BLD AUTO-RTO: 1 % (ref 0–6)
PATH REV BLD -IMP: (no result)
PLATELET # BLD AUTO: 23 K/MM3 (ref 130–400)
PLATELET # BLD AUTO: 24 K/MM3 (ref 130–400)
PLATELET BLD QL SMEAR: (no result)
PMV BLD AUTO: 12.5 FL (ref 7.4–10.4)
PMV BLD AUTO: 13.1 FL (ref 7.4–10.4)
RBC # BLD AUTO: 2.21 M/MM3 (ref 4.1–5.3)
RBC # BLD AUTO: 2.46 M/MM3 (ref 4.1–5.3)

## 2022-05-16 NOTE — NUR
ASSUMED CARE OF PATIENT AFTER RECEIVING BEDSIDE REPORT. ASSESSMENT COMPLETED,
VSS. PATIENT DENIES COMPLAINTS, CONCERNS, AND QUESTIONS AT THIS TIME. NO ACUTE
EVENTS OVERNIGHT. BEDSIDE REPORT TO BE GIVEN TO ONCOMING SHIFT.

## 2022-05-16 NOTE — NUR
Assessment completed, alert/oriented, vital signs stable, denies pain, reports
feeling "about the same", still has moist non-prodcutive cough, some coarse
sounds and exp wheezing in right lung fields/ diminsihed bases, oxygen levels
are WNL on room air, PUlm consulted and making some changes to ABx and adding
some PO steroids, hgbn and Platelets both came down slightly from yesterday/
ONc consulted, no blood products ordered to be given at this time, heart RRR/
distal pulses are palpable, right arm IV infiltrated/ removed and started new
IV to left forearm, patient wants to go home, denies needs at this time, will
continue to monitor

## 2022-05-16 NOTE — NUR
Tiffanie:  Other
Situation:   stopped by room on rounds
Background:  Pt is doing well.  No complaints
Assessment:  Had a good conversation.
Recommendation:   will follow up as needed

## 2022-05-17 VITALS — TEMPERATURE: 98.2 F | SYSTOLIC BLOOD PRESSURE: 112 MMHG | DIASTOLIC BLOOD PRESSURE: 51 MMHG | HEART RATE: 100 BPM

## 2022-05-17 VITALS — HEART RATE: 77 BPM | DIASTOLIC BLOOD PRESSURE: 49 MMHG | SYSTOLIC BLOOD PRESSURE: 104 MMHG | TEMPERATURE: 98 F

## 2022-05-17 VITALS — SYSTOLIC BLOOD PRESSURE: 109 MMHG | DIASTOLIC BLOOD PRESSURE: 50 MMHG | TEMPERATURE: 98.4 F | HEART RATE: 96 BPM

## 2022-05-17 LAB
BASOPHILS NFR BLD MANUAL: 8 % (ref 0–2)
DACRYOCYTES BLD QL SMEAR: (no result)
EOSINOPHIL NFR BLD: 1 % (ref 0–4)
ERYTHROCYTE [DISTWIDTH] IN BLOOD BY AUTOMATED COUNT: 15.9 % (ref 11.5–14.5)
HCT VFR BLD AUTO: 23.5 % (ref 37–47)
HGB BLD-MCNC: 8 G/DL (ref 12.5–16)
LYMPHOCYTES NFR BLD MANUAL: 29 % (ref 20–51)
MCH RBC QN AUTO: 35 PG (ref 27–31)
MCHC RBC AUTO-ENTMCNC: 34 G/DL (ref 33–37)
MCV RBC AUTO: 103 FL (ref 80–100)
METAMYELOCYTES NFR BLD MANUAL: 5 % (ref 0–0)
MONOCYTES NFR BLD: 2 % (ref 1.7–9.3)
MYELOCYTES NFR BLD MANUAL: 3 % (ref 0–0)
NEUTS BAND NFR BLD: 9 % (ref 0–10)
NEUTS SEG NFR BLD MANUAL: 43 % (ref 42–75.2)
OVALOCYTES BLD QL SMEAR: (no result)
PLATELET # BLD AUTO: 21 K/MM3 (ref 130–400)
PLATELET BLD QL SMEAR: (no result)
PMV BLD AUTO: 11.8 FL (ref 7.4–10.4)
RBC # BLD AUTO: 2.29 M/MM3 (ref 4.1–5.3)

## 2022-05-17 NOTE — NUR
Assessment complete. A&Ox3. Denies pain/nausea/shortness of breath. VS stable.
INT to left [id number] well no s/s of infiltration noted. TELE
reporting SR/ST. Noted to have occasional exp wheeze to LLL, otherwise clear.
Plan of care discussed for this shift to include meds/breathing tx/calling for
questions/concerns. Verbalizes understanding. Call light in reach. Will
monitor.

## 2022-05-17 NOTE — NUR
The patient is to discharge back home with her family today, 5/17. ELVIRA met with
the patient and presented and read the IM form outloud to her. The patient
verbalized understanding of agreement to discharge today. She signed the form
and ELVIRA provided her with a copy. She had no questions or concerns for SW. No
additional needs at this time.

## 2022-05-17 NOTE — NUR
Discharge instructions given both verbal and handwritten. Discusse f/u
appt/home medications/s/s of infection and when to return to the ED.
Verbalizes understanding/denies quesitons/concerns. 22g INT to left forearm
DCd-cath intact. Instructed to  home medications at pharmacy and take
as prescribed. Currently waiting on ride.

## 2022-05-18 LAB — ACE SERPL-CCNC: 22 U/L (ref 16–85)

## 2022-05-20 LAB — C-ANCA SER-ACNC: 36 U/ML (ref 0–99)

## 2022-05-23 ENCOUNTER — HOSPITAL ENCOUNTER (OUTPATIENT)
Dept: HOSPITAL 19 - SDCO | Age: 67
Discharge: HOME | End: 2022-05-23
Attending: PATHOLOGY
Payer: MEDICARE

## 2022-05-23 VITALS — BODY MASS INDEX: 24.39 KG/M2 | HEIGHT: 64 IN | WEIGHT: 142.86 LBS

## 2022-05-23 VITALS — SYSTOLIC BLOOD PRESSURE: 106 MMHG | HEART RATE: 81 BPM | DIASTOLIC BLOOD PRESSURE: 92 MMHG

## 2022-05-23 VITALS — DIASTOLIC BLOOD PRESSURE: 45 MMHG | SYSTOLIC BLOOD PRESSURE: 100 MMHG | HEART RATE: 87 BPM

## 2022-05-23 VITALS — DIASTOLIC BLOOD PRESSURE: 50 MMHG | TEMPERATURE: 97.9 F | HEART RATE: 99 BPM | SYSTOLIC BLOOD PRESSURE: 121 MMHG

## 2022-05-23 VITALS — SYSTOLIC BLOOD PRESSURE: 100 MMHG | HEART RATE: 81 BPM | TEMPERATURE: 97.8 F | DIASTOLIC BLOOD PRESSURE: 43 MMHG

## 2022-05-23 VITALS — SYSTOLIC BLOOD PRESSURE: 98 MMHG | HEART RATE: 88 BPM | DIASTOLIC BLOOD PRESSURE: 45 MMHG

## 2022-05-23 DIAGNOSIS — Z87.891: ICD-10-CM

## 2022-05-23 DIAGNOSIS — C92.00: Primary | ICD-10-CM

## 2022-05-23 LAB
BASOPHILS NFR BLD MANUAL: 2 % (ref 0–2)
DACRYOCYTES BLD QL SMEAR: (no result)
EOSINOPHIL NFR BLD: 2 % (ref 0–4)
ERYTHROCYTE [DISTWIDTH] IN BLOOD BY AUTOMATED COUNT: 15.5 % (ref 11.5–14.5)
HCT VFR BLD AUTO: 23.1 % (ref 37–47)
HGB BLD-MCNC: 8.1 G/DL (ref 12.5–16)
LYMPHOCYTES NFR BLD MANUAL: 17 % (ref 20–51)
MCH RBC QN AUTO: 36 PG (ref 27–31)
MCHC RBC AUTO-ENTMCNC: 35 G/DL (ref 33–37)
MCV RBC AUTO: 102 FL (ref 80–100)
METAMYELOCYTES NFR BLD MANUAL: 3 % (ref 0–0)
MONOCYTES NFR BLD: 4 % (ref 1.7–9.3)
MYELOCYTES NFR BLD MANUAL: 3 % (ref 0–0)
NEUTS BAND NFR BLD: 13 % (ref 0–10)
NEUTS SEG NFR BLD MANUAL: 54 % (ref 42–75.2)
OVALOCYTES BLD QL SMEAR: (no result)
PLATELET # BLD AUTO: 23 K/MM3 (ref 130–400)
PLATELET BLD QL SMEAR: (no result)
PMV BLD AUTO: 12.5 FL (ref 7.4–10.4)
RBC # BLD AUTO: 2.27 M/MM3 (ref 4.1–5.3)

## 2022-05-23 NOTE — NUR
VSS. PT continues to deny nausea. NO vomiting. Denies pain. Site is dry and
intact; no bleeding or drainage noted. Bandage remains clean. PT has had
half her muffin and continues to drink coffee. Call bell remains within reach.
PT expressed desire to be discharged.

## 2022-05-23 NOTE — NUR
Estela called from lab to report a critical lab value for platelets: 23 (L).
RN verbally notified DR at this time in PACU.  verbalized understanding. No
new orders recieved.

## 2022-05-23 NOTE — NUR
Pt taken via wheelchair to private vehicle for dc home with friend driving.
Pt left with dc teaching packet and all personal belongings.

## 2022-05-23 NOTE — NUR
VSS. PT continues to deny nausea. Site remains clean, dry and intact upon
second observation. IV discontinued. Catheter tip intact. Pressure bandage
applied. NO reddness or swelling noted. DC instructions and educational
material reveiwed with the PT, who verbalized understanding and signed the
realted paperwork. PT moved to bedside without difficulty, and denied needing
assistance changing into personal clothes. Call bell remains within reach.

## 2022-05-23 NOTE — NUR
PT arrived from procedure drowsy but oriented. Monitors applied and VSS Verbal
report obtained from Jyoti RN: L side was used, PT has been flat for "a few
minutes". Pt will remain flat for a full 10 minutes before RN is to check
site. PT provided a warm muffin, juice and hot coffee per request. Denies
nausea. Denies pain. Side rails x2. Call bell within reach on side table.

## 2022-07-20 ENCOUNTER — HOSPITAL ENCOUNTER (OUTPATIENT)
Dept: HOSPITAL 19 - SDCO | Age: 67
Discharge: HOME | End: 2022-07-20
Attending: PATHOLOGY
Payer: MEDICARE

## 2022-07-20 VITALS — SYSTOLIC BLOOD PRESSURE: 125 MMHG | TEMPERATURE: 97 F | DIASTOLIC BLOOD PRESSURE: 59 MMHG | HEART RATE: 60 BPM

## 2022-07-20 VITALS — HEART RATE: 58 BPM | DIASTOLIC BLOOD PRESSURE: 49 MMHG | SYSTOLIC BLOOD PRESSURE: 123 MMHG

## 2022-07-20 VITALS — HEART RATE: 57 BPM | SYSTOLIC BLOOD PRESSURE: 98 MMHG | DIASTOLIC BLOOD PRESSURE: 48 MMHG

## 2022-07-20 VITALS — HEART RATE: 55 BPM | TEMPERATURE: 97.2 F | SYSTOLIC BLOOD PRESSURE: 113 MMHG | DIASTOLIC BLOOD PRESSURE: 67 MMHG

## 2022-07-20 VITALS — BODY MASS INDEX: 24.84 KG/M2 | WEIGHT: 136.69 LBS | HEIGHT: 62.01 IN

## 2022-07-20 DIAGNOSIS — D72.819: ICD-10-CM

## 2022-07-20 DIAGNOSIS — C92.90: Primary | ICD-10-CM

## 2022-07-20 DIAGNOSIS — D64.9: ICD-10-CM

## 2022-07-20 LAB
ANISOCYTOSIS BLD QL: (no result)
DACRYOCYTES BLD QL SMEAR: (no result)
ERYTHROCYTE [DISTWIDTH] IN BLOOD BY AUTOMATED COUNT: 19.7 % (ref 11.5–14.5)
HCT VFR BLD AUTO: 25.4 % (ref 37–47)
HGB BLD-MCNC: 8.1 G/DL (ref 12.5–16)
HYPOCHROMIA BLD QL SMEAR: (no result)
LYMPHOCYTES NFR BLD MANUAL: 9 % (ref 20–51)
MACROCYTES BLD QL SMEAR: (no result)
MCH RBC QN AUTO: 28 PG (ref 27–31)
MCHC RBC AUTO-ENTMCNC: 32 G/DL (ref 33–37)
MCV RBC AUTO: 89 FL (ref 80–100)
METAMYELOCYTES NFR BLD MANUAL: 1 % (ref 0–0)
MONOCYTES NFR BLD: 20 % (ref 1.7–9.3)
NEUTS BAND NFR BLD: 2 % (ref 0–10)
NEUTS SEG NFR BLD MANUAL: 68 % (ref 42–75.2)
OVALOCYTES BLD QL SMEAR: (no result)
PLATELET # BLD AUTO: 262 K/MM3 (ref 130–400)
PLATELET BLD QL SMEAR: (no result)
PMV BLD AUTO: 13.9 FL (ref 7.4–10.4)
RBC # BLD AUTO: 2.87 M/MM3 (ref 4.1–5.3)
SCHISTOCYTES BLD QL SMEAR: (no result)

## 2022-07-20 NOTE — NUR
The patient arrived back to [facility] from the recovery room at this time. The
patient appears alert and oriented and denies any pain or nausea at this time.
The patient is lying in the supine position as ordered post procedure. The
patient's post procedure vital signs were started at this time. The patient
has ice chips that she appears to be tolerating well. The patient denies
wanting anything further to eat or drink at this time. The patient's 4x4 and
bandaid to her left hip appears clean, dry and intact. Call light is within
reach. Denies any further needs at this time.

## 2022-07-20 NOTE — NUR
Discharge instructions were reviewed with the patient at this time. She
verbalized understanding and has no questions for the nurse at this time. The
patient's PICC line was flushed and clamped at this time. The patient is going
to get dressed and notify the staff when she is ready to be escorted out.

## 2022-07-20 NOTE — NUR
The patient continues to be in the supine position. Vital signs appear stable.
Call light remains within reach.

## 2022-07-20 NOTE — NUR
The patient was escorted out via wheelchair to private vehicle by ANSHUL Thornton.
The patient's belongings and discharge paperwork were sent with her. The
patient's son is present to drive her home.

## 2022-07-20 NOTE — NUR
The patient verbalizes a desire to be discharged home. The patient is going to
contact her son to come pick her up at the ER entrance.

## 2022-07-20 NOTE — NUR
The patient was taken over via cart to the recovery room to be prepped for the
procedure. ANSHUL Peters was notified of the patient's WBC 1.6 that was reported
from the lab and she will let Dr. Lo know prior to the procedure. Will
continue to montior the patient she returns to the unit.